# Patient Record
(demographics unavailable — no encounter records)

---

## 2024-10-10 NOTE — PHYSICAL EXAM
[No Acute Distress] : no acute distress [PERRL] : pupils equal round and reactive to light [Normal TMs] : both tympanic membranes were normal [No Lymphadenopathy] : no lymphadenopathy [Clear to Auscultation] : lungs were clear to auscultation bilaterally [Regular Rhythm] : with a regular rhythm [No Edema] : there was no peripheral edema [Normal] : soft, non-tender, non-distended, no masses palpated, no HSM and normal bowel sounds [Normal Supraclavicular Nodes] : no supraclavicular lymphadenopathy [Normal Posterior Cervical Nodes] : no posterior cervical lymphadenopathy [Normal Anterior Cervical Nodes] : no anterior cervical lymphadenopathy [No CVA Tenderness] : no CVA  tenderness [No Joint Swelling] : no joint swelling [No Rash] : no rash [No Focal Deficits] : no focal deficits [Normal Gait] : normal gait [Normal Insight/Judgement] : insight and judgment were intact [de-identified] : SHORT SYSTOLIC MURMUR

## 2024-10-10 NOTE — ASSESSMENT
[FreeTextEntry1] : anxiety continue xanax hld continue rosuvastatin bp at [facility] continue hctz and diltiazem lab evaluation

## 2024-10-10 NOTE — HISTORY OF PRESENT ILLNESS
[FreeTextEntry1] : pt here for cpe and management of HTN AND HLD  [de-identified] : had surgery on rt thumb going to PT

## 2024-10-10 NOTE — HEALTH RISK ASSESSMENT
[Very Good] : ~his/her~  mood as very good [Yes] : Yes [Monthly or less (1 pt)] : Monthly or less (1 point) [No falls in past year] : Patient reported no falls in the past year [0] : 2) Feeling down, depressed, or hopeless: Not at all (0) [PHQ-2 Negative - No further assessment needed] : PHQ-2 Negative - No further assessment needed [Never] : Never [None] : None [Alone] : lives alone [Retired] : retired [High School] : high school [] :  [# Of Children ___] : has [unfilled] children [Smoke Detector] : smoke detector [Carbon Monoxide Detector] : carbon monoxide detector [Seat Belt] :  uses seat belt [With Patient/Caregiver] : , with patient/caregiver [Designated Healthcare Proxy] : Designated healthcare proxy [Name: ___] : Health Care Proxy's Name: [unfilled]  [Relationship: ___] : Relationship: [unfilled] [Time Spent: ___ minutes] : Time Spent: [unfilled] minutes [de-identified] :  GYN HAND SURGERY  [de-identified] : NO  [de-identified] : multiple issues  discussed  to determine  if any depression is present 5 min spent [[id number]] : 0 [Change in mental status noted] : No change in mental status noted [Language] : denies difficulty with language [Reports changes in hearing] : Reports no changes in hearing [Reports changes in vision] : Reports no changes in vision [Reports changes in dental health] : Reports no changes in dental health [MammogramDate] : 4/23 [de-identified] : OFFICE  [AdvancecareDate] : 10/24  [FreeTextEntry4] : 16 minutes  spent discussing  end of life care and options for treatment such as, a DNR, DNI, dialysis, tube feeds and if patient becomes unable to make decisions for self and has incurable disease that treatment outweighs benefits. COMFORT CARE NO CHANGE

## 2025-03-26 NOTE — REVIEW OF SYSTEMS
[Fever] : no fever [Chills] : chills [Pain] : no pain [Earache] : no earache [Sore Throat] : no sore throat [Chest Pain] : no chest pain [Palpitations] : no palpitations [Shortness Of Breath] : no shortness of breath [Wheezing] : no wheezing [Cough] : no cough [Abdominal Pain] : no abdominal pain [Diarrhea] : diarrhea [Dysuria] : no dysuria [Skin Rash] : no skin rash [Headache] : no headache [Dizziness] : dizziness [Easy Bleeding] : no easy bleeding [Easy Bruising] : no easy bruising

## 2025-03-26 NOTE — ASSESSMENT
[Patient Optimized for Surgery] : Patient optimized for surgery [No Further Testing Recommended] : no further testing recommended [Modify medications prior to procedure] : Modify medications prior to procedure [FreeTextEntry4] : acceptable medical condition for surgery  bp acceptable continue hctz hld continue rosuvastatin

## 2025-03-26 NOTE — HISTORY OF PRESENT ILLNESS
[No Pertinent Cardiac History] : no history of aortic stenosis, atrial fibrillation, coronary artery disease, recent myocardial infarction, or implantable device/pacemaker [No Pertinent Pulmonary History] : no history of asthma, COPD, sleep apnea, or smoking [No Adverse Anesthesia Reaction] : no adverse anesthesia reaction in self or family member [Chronic Anticoagulation] : no chronic anticoagulation [Chronic Kidney Disease] : no chronic kidney disease [Diabetes] : no diabetes [(Patient denies any chest pain, claudication, dyspnea on exertion, orthopnea, palpitations or syncope)] : Patient denies any chest pain, claudication, dyspnea on exertion, orthopnea, palpitations or syncope [Moderate (4-6 METs)] : Moderate (4-6 METs) [FreeTextEntry1] : cataract surgery [FreeTextEntry2] : 4/1  4/15 [FreeTextEntry3] :   [FreeTextEntry4] : pt is a 80 yr old female  here for clearance for cataract surgery.  Pt active problems include htn and hld

## 2025-05-02 NOTE — HISTORY OF PRESENT ILLNESS
[FreeTextEntry1] : This is an 80 year old  female who had a burning sensation in her right 12:00 breast with some thickening.  She attributed it to a new bra and it resolved when she stopped wearing it.  She also went for a mammogram and ultrasound because she hadn't had one in a number of years and microcalcifications were found in the left breast.  A stereotactic biopsy showed DCIS. She was seen on 2/19/2020.  She was sent for an MRI, and a large area of enhancement was found in the left breast.  An MRI guided biopsy confirmed another area of DCIS.  She was started on Anastrozole by Dr. Marin since surgery was delayed due to COVID.  She underwent a left mastectomy with FAMILIA reconstruction on 6/15/2020. She had skin necrosis at 6:00 which healed.  The pathology showed multicentric DCIS.  The lower inner quadrant mastectomy margin was positive and a re-excision with revision/liposuction was performed on 3/8/2021.  The re excision was negative.    A nodule was palpable in the left 3:00 breast in 9/21 that was c/w a lymph node on ultrasound and FNA. She then had a nodule in the left 6:00 breast that was c/w an oil cyst and benign on aspiration in January 2022.  She has no present breast complaints.

## 2025-05-02 NOTE — PHYSICAL EXAM
[EOMI] : extra ocular movement intact [Sclera nonicteric] : sclera nonicteric [Supple] : supple [No Supraclavicular Adenopathy] : no supraclavicular adenopathy [No Cervical Adenopathy] : no cervical adenopathy [Clear to Auscultation Bilat] : clear to auscultation bilaterally [Examined in the supine and seated position] : examined in the supine and seated position [No dominant masses] : no dominant masses in right breast  [No Nipple Retraction] : no left nipple retraction [No Nipple Discharge] : no left nipple discharge [No Axillary Lymphadenopathy] : no left axillary lymphadenopathy [Soft] : abdomen soft [Not Tender] : non-tender [No Palpable Masses] : no abdominal mass palpated [de-identified] : Inframammary incision with vertical incision at 6:00 and medial periareolar extension. <5 mm oval mass 6:00 N2.

## 2025-05-02 NOTE — CONSULT LETTER
[Dear  ___] : Dear  [unfilled], [Consult Letter:] : I had the pleasure of evaluating your patient, [unfilled]. [Sincerely,] : Sincerely, [DrDuyen  ___] : Dr. ORELLANA

## 2025-05-02 NOTE — PAST MEDICAL HISTORY
[Postmenopausal] : The patient is postmenopausal [Menarche Age ____] : age at menarche was [unfilled] [Total Preg ___] : G[unfilled] [Live Births ___] : P[unfilled]  [Age At Live Birth ___] : Age at live birth: [unfilled] [FreeTextEntry8] : 6 weeks, 3 month, 3 months, 6 months

## 2025-05-02 NOTE — DATA REVIEWED
[FreeTextEntry1] : Right mammogram and ultrasound 05/16/2024:  No evidence of malignancy. Multiple benign appearing cysts and cyst clusters.